# Patient Record
Sex: FEMALE | URBAN - METROPOLITAN AREA
[De-identification: names, ages, dates, MRNs, and addresses within clinical notes are randomized per-mention and may not be internally consistent; named-entity substitution may affect disease eponyms.]

---

## 2022-05-31 ENCOUNTER — OFFICE VISIT (OUTPATIENT)
Dept: URBAN - METROPOLITAN AREA CLINIC 71 | Facility: CLINIC | Age: 27
End: 2022-05-31
Payer: COMMERCIAL

## 2022-05-31 DIAGNOSIS — H00.11 CHALAZION RIGHT UPPER EYELID: Primary | ICD-10-CM

## 2022-05-31 PROCEDURE — 99202 OFFICE O/P NEW SF 15 MIN: CPT | Performed by: OPHTHALMOLOGY

## 2022-05-31 RX ORDER — AMOXICILLIN 500 MG/1
500 MG TABLET, FILM COATED ORAL
Qty: 42 | Refills: 0 | Status: ACTIVE
Start: 2022-05-31

## 2022-05-31 RX ORDER — ERYTHROMYCIN 5 MG/G
OINTMENT OPHTHALMIC
Qty: 3.5 | Refills: 0 | Status: ACTIVE
Start: 2022-05-31

## 2022-05-31 ASSESSMENT — INTRAOCULAR PRESSURE
OD: 14
OS: 19

## 2022-05-31 NOTE — IMPRESSION/PLAN
Impression: Chalazion right upper eyelid: H00.11. Recommend starting Amoxicillin 500 MG TID Start Erythromycin MAURICIO TID OD Plan: Jhoana Esha is a firm bump that represents inflammation of an oil gland in the eyelid. It is not contagious. Rarely, it may get secondarily infected. Chalazion may improve with topical steroid eye drops, warm compresses, eye lid scrubs, and oral antibiotics. Resistant cases may be surgically treated. Contact office if Chalazion does not improve or worsens despite treatment.

## 2022-06-24 ENCOUNTER — OFFICE VISIT (OUTPATIENT)
Dept: URBAN - METROPOLITAN AREA CLINIC 72 | Facility: CLINIC | Age: 27
End: 2022-06-24
Payer: COMMERCIAL

## 2022-06-24 DIAGNOSIS — H00.11 CHALAZION RIGHT UPPER EYELID: ICD-10-CM

## 2022-06-24 DIAGNOSIS — H52.13 MYOPIA, BILATERAL: Primary | ICD-10-CM

## 2022-06-24 PROCEDURE — 92012 INTRM OPH EXAM EST PATIENT: CPT | Performed by: OPTOMETRIST

## 2022-06-24 RX ORDER — NEOMYCIN, POLYMYXIN B SULFATES, DEXAMETHASONE 1; 3.5; 1 MG/G; MG/G; [USP'U]/G
OINTMENT OPHTHALMIC
Qty: 1 | Refills: 0 | Status: ACTIVE
Start: 2022-06-24

## 2022-06-24 ASSESSMENT — INTRAOCULAR PRESSURE
OS: 16
OD: 15

## 2022-06-24 ASSESSMENT — VISUAL ACUITY
OD: 20/20
OS: 20/20

## 2022-06-24 NOTE — IMPRESSION/PLAN
Impression: Chalazion right upper eyelid: H00.11.

pt stopped warm compresses after starting orals and antibiotic naz. Plan: Discussed diagnosis in detail with patient. Discussed treatment options with patient. Patient instructed to apply warm compresses. Lid scrubs and hygiene were explained. Explained to pt that it can take a month or more for the Chalazion to full resolve. Discussed starting another naz, vs excising the chalazion. Will start with new naz and warm compresses, if no improvement will re-discuss excision Pt voiced understanding

## 2024-09-11 ENCOUNTER — OFFICE VISIT (OUTPATIENT)
Dept: URBAN - METROPOLITAN AREA CLINIC 71 | Facility: CLINIC | Age: 29
End: 2024-09-11
Payer: COMMERCIAL

## 2024-09-11 DIAGNOSIS — H52.13 MYOPIA, BILATERAL: Primary | ICD-10-CM

## 2024-09-11 PROCEDURE — 92012 INTRM OPH EXAM EST PATIENT: CPT | Performed by: OPTOMETRIST

## 2024-09-11 ASSESSMENT — INTRAOCULAR PRESSURE
OS: 15
OD: 9

## 2024-09-11 ASSESSMENT — VISUAL ACUITY
OD: 20/20
OS: 20/20

## 2024-09-11 ASSESSMENT — KERATOMETRY
OS: 43.50
OD: 43.75